# Patient Record
Sex: FEMALE | Race: BLACK OR AFRICAN AMERICAN | NOT HISPANIC OR LATINO | Employment: FULL TIME | URBAN - METROPOLITAN AREA
[De-identification: names, ages, dates, MRNs, and addresses within clinical notes are randomized per-mention and may not be internally consistent; named-entity substitution may affect disease eponyms.]

---

## 2023-08-21 ENCOUNTER — HOSPITAL ENCOUNTER (OUTPATIENT)
Dept: DATA CONVERSION | Facility: HOSPITAL | Age: 18
End: 2023-08-21
Attending: STUDENT IN AN ORGANIZED HEALTH CARE EDUCATION/TRAINING PROGRAM | Admitting: STUDENT IN AN ORGANIZED HEALTH CARE EDUCATION/TRAINING PROGRAM
Payer: MEDICAID

## 2023-08-21 DIAGNOSIS — K50.10 CROHN'S DISEASE OF LARGE INTESTINE WITHOUT COMPLICATIONS (MULTI): ICD-10-CM

## 2023-08-25 LAB
COMPLETE PATHOLOGY REPORT: NORMAL
CONVERTED CLINICAL DIAGNOSIS-HISTORY: NORMAL
CONVERTED FINAL DIAGNOSIS: NORMAL
CONVERTED FINAL REPORT PDF LINK TO COPY AND PASTE: NORMAL
CONVERTED GROSS DESCRIPTION: NORMAL

## 2023-09-29 VITALS
HEART RATE: 99 BPM | TEMPERATURE: 96.8 F | SYSTOLIC BLOOD PRESSURE: 110 MMHG | RESPIRATION RATE: 20 BRPM | DIASTOLIC BLOOD PRESSURE: 66 MMHG

## 2023-09-30 NOTE — H&P
History of Present Illness:   Pregnant/Lactating:  ·  Are You Pregnant no (1)   ·  Are You Currently Breastfeeding no (1)     History Present Illness:  Reason for surgery: IBD   HPI:    Marian is a 18 year old female with IBD here today for Esophagogastroduodenoscopy (EGD) and colonoscopy     Allergies:        Allergies:  ·  NKDA :   ·  Dust : Other  ·  Pollen : Other  ·  Dust : Unknown, HS  ·  Pollen : Unknown, [facility]    Home Medication Review:   Home Medications Reviewed: yes     Impression/Procedure:   ·  Impression and Planned Procedure: IBD       ERAS (Enhanced Recovery After Surgery):  ·  ERAS Patient: no       Vital Signs:  Temperature C: 36 degrees C   Temperature F: 96.8 degrees F   Heart Rate: 99 beats per minute   Respiratory Rate: 20 breath per minute   Blood Pressure Systolic: 110 mm/Hg   Blood Pressure Diastolic: 66 mm/Hg     Physical Exam by System:    Constitutional: Awake, alert, no acute distress   Eyes: No discharge, conjunctivae clear   ENMT: Moist mucous membranes   Respiratory/Thorax: Unlabored breathing, symmetric  chest rise   Cardiovascular: Capillary refill < 2 seconds   Gastrointestinal: Soft, Non tender, non distended,  no palpable masses   Musculoskeletal: no gross deformities   Extremities: no edema   Neurological: awake, alert and interactive   Skin: Warm, well perfused, no generalized rashes     Consent:   COVID-19 Consent:  ·  COVID-19 Risk Consent Surgeon has reviewed key risks related to the risk of lucretia COVID-19 and if they contract COVID-19 what the risks are.     Attestation:   Note Completion:  I am a:  Resident/Fellow   Attending Attestation I saw and evaluated the patient.  I personally obtained the key and critical portions of the history and physical exam or was physically present for key and  critical portions performed by the resident/fellow. I reviewed the resident/fellow?s documentation and discussed the patient with the resident/fellow.  I agree with the  "resident/fellow?s medical decision making as documented in the note.     I personally evaluated the patient on 21-Aug-2023         Electronic Signatures:  Maria Isabel Hussein)  (Signed 21-Aug-2023 15:12)   Authored: Note Completion   Co-Signer: History of Present Illness, Allergies, Home Medication Review, Impression/Procedure, ERAS, Physical Exam, Consent, Note Completion  Vania Genao (Fellow))  (Signed 21-Aug-2023 13:57)   Authored: History of Present Illness, Allergies, Home  Medication Review, Impression/Procedure, ERAS, Physical Exam, Consent, Note Completion      Last Updated: 21-Aug-2023 15:12 by Maria Isabel Hussein (MD)    References:  1.  Data Referenced From \"Patient Profile - Preop - Pediatric v3\" 21-Aug-2023 12:08   "

## 2023-10-04 DIAGNOSIS — K50.118 CROHN'S DISEASE OF COLON WITH OTHER COMPLICATION (MULTI): ICD-10-CM

## 2023-10-04 RX ORDER — USTEKINUMAB 90 MG/ML
90 INJECTION, SOLUTION SUBCUTANEOUS
Qty: 1 ML | Refills: 3 | Status: SHIPPED | OUTPATIENT
Start: 2023-10-04 | End: 2024-02-06

## 2023-10-05 ENCOUNTER — SPECIALTY PHARMACY (OUTPATIENT)
Dept: PHARMACY | Facility: CLINIC | Age: 18
End: 2023-10-05

## 2023-10-06 ENCOUNTER — TELEPHONE (OUTPATIENT)
Dept: PEDIATRIC GASTROENTEROLOGY | Facility: HOSPITAL | Age: 18
End: 2023-10-06
Payer: MEDICAID

## 2023-10-06 NOTE — TELEPHONE ENCOUNTER
"REBECA [address] Dr patient. Heydi faxed note that [employer] already approved until 6/7/2024 for 90 mg/mL syringe. Filed prescriptions/rx dated 10/5/23.    [facility] also faxed response to [employer] PA-\"We do not manage pharmacy benefits for this plan. Please contact Heydi...\"  "

## 2023-10-11 ENCOUNTER — TELEPHONE (OUTPATIENT)
Dept: PEDIATRIC GASTROENTEROLOGY | Facility: HOSPITAL | Age: 18
End: 2023-10-11
Payer: MEDICAID

## 2023-10-11 NOTE — TELEPHONE ENCOUNTER
[facility] states ready to order and will have $0 co pay.   She suggested Marian call [phone]. Ginger tried contacting Marian while I was on the phone and reports just rang no option to leave VM.Called Marian and left detailed message with number to call to order.    ,DirectAddress_Unknown

## 2023-10-11 NOTE — TELEPHONE ENCOUNTER
Spoke with Marian she said she is having trouble ordering stelara it was due 10-7-23. Called [facility]

## 2023-10-11 NOTE — TELEPHONE ENCOUNTER
Spoke with Marian and provided number for her to call and order. Suggested asking if they can get it to her ASAP.

## 2023-10-25 NOTE — PROGRESS NOTES
Marian Leung is a 18-year-old female with a long history of rectal and anal Crohn's. She had MRIs in 2021, and 2022 that showed a potential intersphincteric abscess or fistula though she has not had symptoms of that. She developed increased anal pain after a flare and was not able get a good exam  in the office so she was taken to the OR to make sure there is nothing infected.       06/03/2022 OPERATION/PROCEDURE: Exam under anesthesia and flexible sigmoidoscopy.     She presents to the office due to have pain investigated.  She reports a couple of weeks ago she noted increased mucous drainage and pain and burning inside the anal canal.  She has not had any fevers at all.  She is having more constipation recently and having to use miralax, but stool are soft and skinny.      8/21/2023 COLONOSCOPY TO TI  A diffuse area of moderately erythematous and ulcerewated mucosa was found in the rectum. Four biopsies were obtained with cold forceps for histology in the right colon, as well as four biopsies in the transverse colon, four biopsies in the left colon and four biopsies in the rectum.  The terminal ileum appeared normal. Four biopsies were obtained with cold forceps for histology in the terminal ileum.    8/21/2023 EGD  The examined esophagus was normal. Four biopsies were obtained with cold forceps for histology in the lower third of the esophagus, as well as  two biopsies in the middle third of the esophagus.  Diffuse moderately erythematous mucosa without bleeding was found in the stomach. Two biopsies were obtained with cold forceps for histology in the gastric antrum, as well as two biopsies in the gastric body.  The second portion of the duodenum was normal. Four biopsies were obtained with cold forceps for histology in the second portion of the duodenum, as well as two biopsies in the duodenal bulb.    FINAL DIAGNOSIS  A.  DUODENUM, SECOND PORTION, BIOPSY:  --NORMAL VILLOUS ARCHITECTURE WITH NO SIGNIFICANT  HISTOPATHOLOGIC CHANGE.     B.  DUODENAL BULB, BIOPSY:    --NORMAL VILLOUS ARCHITECTURE WITH NO SIGNIFICANT HISTOPATHOLOGIC CHANGE.    C.  STOMACH, BIOPSY:    --NO SIGNIFICANT HISTOPATHOLOGIC CHANGE.  --NEGATIVE FOR HELICOBACTER PYLORI-LIKE ORGANISMS BY MORPHOLOGY.    D.  ESOPHAGUS, DISTAL, BIOPSY:    --NO SIGNIFICANT HISTOPATHOLOGIC CHANGE.  --NEGATIVE FOR INTRAEPITHELIAL EOSINOPHILS.    E.  ESOPHAGUS, MID, BIOPSY:  --NO SIGNIFICANT HISTOPATHOLOGIC CHANGE.  --NEGATIVE FOR INTRAEPITHELIAL EOSINOPHILS.    F.  TERMINAL ILEUM, BIOPSY:      --NORMAL VILLOUS ARCHITECTURE WITH NO SIGNIFICANT HISTOPATHOLOGIC CHANGE.    G.  COLON, RIGHT, BIOPSY:    --NO SIGNIFICANT HISTOPATHOLOGIC CHANGE.    H.  COLON, TRANSVERSE, BIOPSY:      --NO SIGNIFICANT HISTOPATHOLOGIC CHANGE.    I.  COLON, LEFT, BIOPSY:      --MILD CHRONIC INACTIVE COLITIS.    J.  RECTUM, BIOPSY:      --MILD CHRONIC PROCTITIS WITH SUPERFICIAL EROSION.      After that scope she started using suppositories, they did not help at all.    Bowel habits are 2x/day sometimes hard - needing miralax.    She is using stellara and methotrexate for her Crohn's disease.           Review of Systems  Constitutional: Negative for fever, chills, anorexia, weight loss, malaise     ENMT: Negative for nasal discharge, congestion, ear pain, mouth pain, throat pain      Respiratory: Negative for cough, hemoptysis, wheezing, shortness of breath      Cardiac: Negative for chest pain, dyspnea on exertion, orthopnea, palpitations, syncope     Gastrointestinal: Negative for nausea, vomiting, diarrhea, constipation, abdominal pain (+) CROHNS     Genitourinary: Negative for discharge, dysuria, flank pain, frequency, hematuria     Musculoskeletal: Negative for decreased ROM, pain, swelling, weakness     Neurological: Negative for dizziness, confusion, headache, seizures, syncope (+) HEADACHES     Psychiatric: Negative for mood changes, anxiety, hallucinations, sleep changes, suicidal ideas       Skin: Negative for mass, pain, itching, rash, ulcer (+) PSORIASIS & ECZEMA     Endocrine: Negative for heat intolerance, cold intolerance, excessive sweating, polyuria, excess thirst      Hematologic/Lymph: Negative for anemia, bruising, easy bleeding, night sweats, petechiae, history of DVT/PE or     Allergic/Immunologic: Negative for anaphylaxis, itchy/ teary eyes, itching, sneezing, swelling     PE  Gen - well appearing in NAD   Abd - soft NT   Pt with severe perianal excoriation all around the anus with open skin and moisture related changes.  Gluteal cleft opening healed.   She has a very tight anal canal with some irregularity and mild stricture proximally 1.5 cm.        Impression - Pt with perianal and low rectal Crohn's  - doing ok with stellara, but seems like she is flaring.  Likely she is not having true constipation, but difficulty evacuating due to her anal pain.      Plan   Fecal calprotectin to see how active her rectal disease is currently.    Anal skin protection measures - aquaphor and calmoseptine for the skin excoriation  Call us back if she is not better and may need another EUA to see what is going on  Right now will not try dilt cream until we have a true diagnosis of fissure and I am unable to tell   Continue miralax to keep stools soft

## 2023-11-01 ENCOUNTER — OFFICE VISIT (OUTPATIENT)
Dept: SURGERY | Facility: CLINIC | Age: 18
End: 2023-11-01
Payer: MEDICAID

## 2023-11-01 VITALS
SYSTOLIC BLOOD PRESSURE: 112 MMHG | DIASTOLIC BLOOD PRESSURE: 66 MMHG | BODY MASS INDEX: 33.87 KG/M2 | HEART RATE: 81 BPM | OXYGEN SATURATION: 98 % | WEIGHT: 168 LBS | HEIGHT: 59 IN | TEMPERATURE: 97.2 F

## 2023-11-01 DIAGNOSIS — K50.919 CROHN'S DISEASE WITH COMPLICATION, UNSPECIFIED GASTROINTESTINAL TRACT LOCATION (MULTI): ICD-10-CM

## 2023-11-01 DIAGNOSIS — L30.9 DERMATITIS, UNSPECIFIED: ICD-10-CM

## 2023-11-01 DIAGNOSIS — K50.118 CROHN'S DISEASE OF COLON WITH OTHER COMPLICATION (MULTI): Primary | ICD-10-CM

## 2023-11-01 PROBLEM — M22.2X1 PATELLOFEMORAL SYNDROME OF BOTH KNEES: Status: ACTIVE | Noted: 2023-11-01

## 2023-11-01 PROBLEM — H04.129 DRY EYE SYNDROME: Status: ACTIVE | Noted: 2023-11-01

## 2023-11-01 PROBLEM — E88.89: Status: ACTIVE | Noted: 2023-11-01

## 2023-11-01 PROBLEM — K50.10: Status: ACTIVE | Noted: 2023-11-01

## 2023-11-01 PROBLEM — L30.8 OTHER SPECIFIED DERMATITIS: Status: ACTIVE | Noted: 2019-02-26

## 2023-11-01 PROBLEM — J06.9 VIRAL URI WITH COUGH: Status: ACTIVE | Noted: 2023-11-01

## 2023-11-01 PROBLEM — L73.8 OTHER SPECIFIED FOLLICULAR DISORDERS: Status: ACTIVE | Noted: 2019-02-26

## 2023-11-01 PROBLEM — L28.0 LICHEN SIMPLEX CHRONICUS: Status: ACTIVE | Noted: 2019-02-26

## 2023-11-01 PROBLEM — R07.89 COSTOCHONDRAL PAIN: Status: ACTIVE | Noted: 2023-11-01

## 2023-11-01 PROBLEM — J02.9 SORE THROAT: Status: ACTIVE | Noted: 2023-11-01

## 2023-11-01 PROBLEM — H52.203 ASTIGMATISM OF BOTH EYES: Status: ACTIVE | Noted: 2023-11-01

## 2023-11-01 PROBLEM — G43.909 HEADACHE, MIGRAINE: Status: ACTIVE | Noted: 2023-11-01

## 2023-11-01 PROBLEM — D84.9 IMMUNOSUPPRESSION (MULTI): Status: ACTIVE | Noted: 2023-11-01

## 2023-11-01 PROBLEM — L73.2 HIDRADENITIS SUPPURATIVA OF LEFT AXILLA: Status: ACTIVE | Noted: 2023-11-01

## 2023-11-01 PROBLEM — K86.9: Status: ACTIVE | Noted: 2023-11-01

## 2023-11-01 PROBLEM — L03.012 PARONYCHIA OF FINGER OF LEFT HAND: Status: ACTIVE | Noted: 2023-11-01

## 2023-11-01 PROBLEM — Z79.631 METHOTREXATE, LONG TERM, CURRENT USE: Status: ACTIVE | Noted: 2023-11-01

## 2023-11-01 PROBLEM — N13.30 HYDRONEPHROSIS OF RIGHT KIDNEY: Status: ACTIVE | Noted: 2023-11-01

## 2023-11-01 PROBLEM — J45.909 ASTHMA (HHS-HCC): Status: ACTIVE | Noted: 2023-11-01

## 2023-11-01 PROBLEM — L73.9 FOLLICULAR DISORDER, UNSPECIFIED: Status: ACTIVE | Noted: 2019-02-26

## 2023-11-01 PROBLEM — H52.13 MYOPIA, BILATERAL: Status: ACTIVE | Noted: 2023-11-01

## 2023-11-01 PROBLEM — L40.0 PSORIASIS VULGARIS: Status: ACTIVE | Noted: 2019-02-26

## 2023-11-01 PROBLEM — K50.90 CROHN'S DISEASE (MULTI): Status: ACTIVE | Noted: 2023-11-01

## 2023-11-01 PROBLEM — M21.852 ACQUIRED DYSPLASIA OF LEFT HIP: Status: ACTIVE | Noted: 2023-11-01

## 2023-11-01 PROBLEM — F90.0 ATTENTION DEFICIT HYPERACTIVITY DISORDER, INATTENTIVE TYPE, MODERATE: Status: ACTIVE | Noted: 2023-11-01

## 2023-11-01 PROBLEM — N28.89 DILATION OF RENAL PELVIS: Status: ACTIVE | Noted: 2023-11-01

## 2023-11-01 PROBLEM — M22.2X2 PATELLOFEMORAL SYNDROME OF BOTH KNEES: Status: ACTIVE | Noted: 2023-11-01

## 2023-11-01 PROBLEM — M21.959 HIP DEFORMITY: Status: ACTIVE | Noted: 2023-11-01

## 2023-11-01 PROBLEM — K50.913 PERIANAL FISTULA DUE TO CROHN'S DISEASE (MULTI): Status: ACTIVE | Noted: 2023-11-01

## 2023-11-01 PROBLEM — R07.9 CHEST PAIN: Status: ACTIVE | Noted: 2023-11-01

## 2023-11-01 PROBLEM — R79.89 LOW VITAMIN D LEVEL: Status: ACTIVE | Noted: 2023-11-01

## 2023-11-01 PROBLEM — D50.9 ANEMIA, IRON DEFICIENCY: Status: ACTIVE | Noted: 2023-11-01

## 2023-11-01 PROBLEM — L40.9 PSORIASIS: Status: ACTIVE | Noted: 2019-02-26

## 2023-11-01 PROCEDURE — 99214 OFFICE O/P EST MOD 30 MIN: CPT | Performed by: COLON & RECTAL SURGERY

## 2023-11-01 RX ORDER — DICYCLOMINE HYDROCHLORIDE 10 MG/1
20 CAPSULE ORAL
COMMUNITY
Start: 2022-04-21 | End: 2024-03-07 | Stop reason: WASHOUT

## 2023-11-01 RX ORDER — FERROUS SULFATE 325(65) MG
65 TABLET, DELAYED RELEASE (ENTERIC COATED) ORAL
COMMUNITY

## 2023-11-01 RX ORDER — DOXYCYCLINE 100 MG/1
100 TABLET ORAL 2 TIMES DAILY
COMMUNITY
Start: 2023-09-01 | End: 2024-03-07 | Stop reason: WASHOUT

## 2023-11-01 RX ORDER — HYOSCYAMINE SULFATE 0.125 MG
TABLET ORAL
COMMUNITY
Start: 2023-03-09 | End: 2024-03-07 | Stop reason: WASHOUT

## 2023-11-01 RX ORDER — CHOLECALCIFEROL (VITAMIN D3) 125 MCG
125 CAPSULE ORAL
COMMUNITY
Start: 2023-10-23

## 2023-11-01 RX ORDER — CLINDAMYCIN PHOSPHATE 10 UG/ML
LOTION TOPICAL
COMMUNITY
Start: 2017-06-26 | End: 2024-03-07 | Stop reason: WASHOUT

## 2023-11-01 RX ORDER — AMMONIUM LACTATE 12 G/100G
LOTION TOPICAL
COMMUNITY
Start: 2016-03-01 | End: 2024-03-07 | Stop reason: WASHOUT

## 2023-11-01 RX ORDER — ACETAMINOPHEN 325 MG/1
TABLET ORAL
COMMUNITY
Start: 2023-03-09 | End: 2024-03-07 | Stop reason: WASHOUT

## 2023-11-01 RX ORDER — METRONIDAZOLE 500 MG/1
500 TABLET ORAL
COMMUNITY
Start: 2023-03-09 | End: 2024-03-07 | Stop reason: WASHOUT

## 2023-11-01 RX ORDER — SULFAMETHOXAZOLE AND TRIMETHOPRIM 800; 160 MG/1; MG/1
1 TABLET ORAL
COMMUNITY
Start: 2015-10-01 | End: 2024-03-07 | Stop reason: WASHOUT

## 2023-11-01 RX ORDER — GRISEOFULVIN 250 MG/1
TABLET ORAL
COMMUNITY
Start: 2015-09-21 | End: 2024-03-07 | Stop reason: WASHOUT

## 2023-11-01 RX ORDER — PIMECROLIMUS 10 MG/G
CREAM TOPICAL
COMMUNITY
Start: 2015-10-29 | End: 2024-03-07 | Stop reason: WASHOUT

## 2023-11-01 RX ORDER — ALBUTEROL SULFATE 0.83 MG/ML
2.5 SOLUTION RESPIRATORY (INHALATION)
COMMUNITY
Start: 2023-09-06 | End: 2024-03-07 | Stop reason: WASHOUT

## 2023-11-01 RX ORDER — BETAMETHASONE DIPROPIONATE 0.5 MG/G
OINTMENT TOPICAL
COMMUNITY
Start: 2018-07-24 | End: 2024-03-07 | Stop reason: WASHOUT

## 2023-11-01 RX ORDER — MESALAMINE 1000 MG/1
1000 SUPPOSITORY RECTAL
COMMUNITY
Start: 2023-08-24

## 2023-11-01 RX ORDER — CIPROFLOXACIN 500 MG/1
500 TABLET ORAL
COMMUNITY
Start: 2023-03-09 | End: 2024-03-07 | Stop reason: WASHOUT

## 2023-11-01 RX ORDER — MESALAMINE 0.38 G/1
CAPSULE, EXTENDED RELEASE ORAL
COMMUNITY

## 2023-11-01 RX ORDER — DESONIDE 0.5 MG/G
OINTMENT TOPICAL 2 TIMES DAILY
COMMUNITY
Start: 2014-12-31 | End: 2024-03-07 | Stop reason: WASHOUT

## 2023-11-01 RX ORDER — ALBUTEROL SULFATE 90 UG/1
2 AEROSOL, METERED RESPIRATORY (INHALATION)
COMMUNITY
Start: 2023-09-06 | End: 2024-03-07 | Stop reason: WASHOUT

## 2023-11-01 RX ORDER — ONDANSETRON HYDROCHLORIDE 8 MG/1
8 TABLET, FILM COATED ORAL
COMMUNITY
Start: 2023-03-09 | End: 2024-03-07 | Stop reason: WASHOUT

## 2023-11-01 RX ORDER — FOLIC ACID 1 MG/1
1 TABLET ORAL DAILY
COMMUNITY

## 2023-11-01 RX ORDER — BROMPHENIRAMINE MALEATE, PSEUDOEPHEDRINE HYDROCHLORIDE, AND DEXTROMETHORPHAN HYDROBROMIDE 2; 30; 10 MG/5ML; MG/5ML; MG/5ML
5 SYRUP ORAL
COMMUNITY
Start: 2023-09-01 | End: 2024-03-07 | Stop reason: WASHOUT

## 2023-11-01 RX ORDER — HYDROCORTISONE 25 MG/G
CREAM TOPICAL 2 TIMES DAILY
COMMUNITY
Start: 2023-07-03

## 2023-11-01 RX ORDER — AZELASTINE HYDROCHLORIDE 0.5 MG/ML
SOLUTION/ DROPS OPHTHALMIC
COMMUNITY
End: 2024-03-07 | Stop reason: WASHOUT

## 2023-11-01 RX ORDER — HYDROCORTISONE 25 MG/G
CREAM TOPICAL
COMMUNITY
Start: 2023-01-24

## 2023-11-01 RX ORDER — CLOBETASOL PROPIONATE 0.5 MG/G
OINTMENT TOPICAL
COMMUNITY
Start: 2023-09-01 | End: 2024-03-07 | Stop reason: WASHOUT

## 2023-11-01 RX ORDER — ALCLOMETASONE DIPROPIONATE 0.5 MG/G
OINTMENT TOPICAL
COMMUNITY
Start: 2015-07-16 | End: 2024-03-07 | Stop reason: WASHOUT

## 2023-11-01 RX ORDER — METHOTREXATE 2.5 MG/1
2.5 TABLET ORAL
COMMUNITY

## 2023-11-01 RX ORDER — OMEPRAZOLE 40 MG/1
40 CAPSULE, DELAYED RELEASE ORAL
COMMUNITY
Start: 2023-03-09

## 2023-11-01 RX ORDER — FLUCONAZOLE 150 MG/1
150 TABLET ORAL
COMMUNITY
Start: 2015-10-22 | End: 2024-03-07 | Stop reason: WASHOUT

## 2023-11-01 RX ORDER — ACETAMINOPHEN 500 MG
5000 TABLET ORAL
COMMUNITY
Start: 2023-10-23

## 2023-11-01 RX ORDER — FLUTICASONE PROPIONATE 50 MCG
1 SPRAY, SUSPENSION (ML) NASAL 2 TIMES DAILY
COMMUNITY
Start: 2023-09-01

## 2023-11-01 RX ORDER — TRIAMCINOLONE ACETONIDE OINTMENT USP, 0.05% 0.5 MG/G
OINTMENT TOPICAL
COMMUNITY
Start: 2015-10-22 | End: 2024-03-07 | Stop reason: WASHOUT

## 2023-11-01 RX ORDER — TRIAMCINOLONE ACETONIDE 1 MG/G
CREAM TOPICAL
COMMUNITY
Start: 2015-07-27

## 2023-11-01 RX ORDER — CRISABOROLE 20 MG/G
OINTMENT TOPICAL
COMMUNITY
Start: 2022-06-08 | End: 2024-03-07 | Stop reason: WASHOUT

## 2023-11-01 RX ORDER — BENZONATATE 200 MG/1
200 CAPSULE ORAL
COMMUNITY
Start: 2023-04-25 | End: 2024-03-07 | Stop reason: WASHOUT

## 2023-11-01 RX ORDER — TACROLIMUS 0.3 MG/G
OINTMENT TOPICAL
COMMUNITY
Start: 2015-09-02 | End: 2024-03-07 | Stop reason: WASHOUT

## 2023-11-01 RX ORDER — CLOTRIMAZOLE 1 %
CREAM (GRAM) TOPICAL
COMMUNITY
Start: 2017-06-26 | End: 2024-03-07 | Stop reason: WASHOUT

## 2023-11-01 RX ORDER — BENZOCAINE .13; .15; .5; 2 G/100G; G/100G; G/100G; G/100G
2 GEL ORAL DAILY
COMMUNITY
Start: 2022-10-15 | End: 2024-03-07 | Stop reason: WASHOUT

## 2023-11-01 RX ORDER — KETOCONAZOLE 20 MG/ML
SHAMPOO, SUSPENSION TOPICAL
COMMUNITY
Start: 2015-06-03 | End: 2024-03-07 | Stop reason: WASHOUT

## 2023-11-01 RX ORDER — PREDNISONE 20 MG/1
20 TABLET ORAL
COMMUNITY
Start: 2023-09-06

## 2023-11-01 RX ORDER — ACETAMINOPHEN 500 MG
2000 TABLET ORAL DAILY
COMMUNITY
End: 2024-03-07 | Stop reason: WASHOUT

## 2023-11-01 ASSESSMENT — ENCOUNTER SYMPTOMS: DEPRESSION: 0

## 2023-11-01 ASSESSMENT — PAIN SCALES - GENERAL: PAINLEVEL: 2

## 2023-12-04 NOTE — PROGRESS NOTES
Ayden Leung is a 18-year-old female with a long history of rectal and anal Crohn's. She had MRIs in 2021, and 2022 that showed a potential intersphincteric abscess or fistula though she has not had symptoms of that. She developed increased anal pain after a flare and was not able get a good exam  in the office so she was taken to the OR to make sure there is nothing infected.      06/03/2022 OPERATION/PROCEDURE: Exam under anesthesia and flexible sigmoidoscopy.      8/21/2023 COLONOSCOPY TO TI  A diffuse area of moderately erythematous and ulcerewated mucosa was found in the rectum. Four biopsies were obtained with cold forceps for histology in the right colon, as well as four biopsies in the transverse colon, four biopsies in the left colon and four biopsies in the rectum.  The terminal ileum appeared normal. Four biopsies were obtained with cold forceps for histology in the terminal ileum.     8/21/2023 EGD  The examined esophagus was normal. Four biopsies were obtained with cold forceps for histology in the lower third of the esophagus, as well as  two biopsies in the middle third of the esophagus.  Diffuse moderately erythematous mucosa without bleeding was found in the stomach. Two biopsies were obtained with cold forceps for histology in the gastric antrum, as well as two biopsies in the gastric body.  The second portion of the duodenum was normal. Four biopsies were obtained with cold forceps for histology in the second portion of the duodenum, as well as two biopsies in the duodenal bulb.     FINAL DIAGNOSIS  A.  DUODENUM, SECOND PORTION, BIOPSY:  --NORMAL VILLOUS ARCHITECTURE WITH NO SIGNIFICANT HISTOPATHOLOGIC CHANGE.     B.  DUODENAL BULB, BIOPSY:    --NORMAL VILLOUS ARCHITECTURE WITH NO SIGNIFICANT HISTOPATHOLOGIC CHANGE.    C.  STOMACH, BIOPSY:    --NO SIGNIFICANT HISTOPATHOLOGIC CHANGE.  --NEGATIVE FOR HELICOBACTER PYLORI-LIKE ORGANISMS BY MORPHOLOGY.    D.  ESOPHAGUS, DISTAL, BIOPSY:    --NO  SIGNIFICANT HISTOPATHOLOGIC CHANGE.  --NEGATIVE FOR INTRAEPITHELIAL EOSINOPHILS.    E.  ESOPHAGUS, MID, BIOPSY:  --NO SIGNIFICANT HISTOPATHOLOGIC CHANGE.  --NEGATIVE FOR INTRAEPITHELIAL EOSINOPHILS.    F.  TERMINAL ILEUM, BIOPSY:      --NORMAL VILLOUS ARCHITECTURE WITH NO SIGNIFICANT HISTOPATHOLOGIC CHANGE.    G.  COLON, RIGHT, BIOPSY:    --NO SIGNIFICANT HISTOPATHOLOGIC CHANGE.    H.  COLON, TRANSVERSE, BIOPSY:      --NO SIGNIFICANT HISTOPATHOLOGIC CHANGE.    I.  COLON, LEFT, BIOPSY:      --MILD CHRONIC INACTIVE COLITIS.    J.  RECTUM, BIOPSY:      --MILD CHRONIC PROCTITIS WITH SUPERFICIAL EROSION.     She presented to office November 1, 2023, to have pain investigated.  She reported a couple of weeks prior to the appointment she noted increased mucous drainage and pain and burning inside the anal canal.   She was  having more constipation and having to use miralax, but stool are soft and skinny.    A fecal calprotectin was ordered by not completed.    Went to urgent care on December 4, 2023,  for Bartholin's gland abscess.  Was draining pus and blood. Was put on Bactrim.  Took Tylenol for pain. No narcotics.  Currently on Stellara.  Has been having trouble moving her bowels.  She has not moved her bowels for two weeks.  She is taking Miralax but it's not helping.  She is having LLQ abdominal pain that is crampy and intermittent - she is pretty sure that it is related to her constipation.    Some weeks she can move her bowels and other weeks it's more difficult.  She has not moved her bowels in a couple of weeks.  She is nervous about it now.  That being said her bottom is feeling better           Review of Systems  Constitutional: Negative for fever, chills, anorexia, weight loss, malaise      ENMT: Negative for nasal discharge, congestion, ear pain, mouth pain, throat pain      Respiratory: Negative for cough, hemoptysis, wheezing, shortness of breath      Cardiac: Negative for chest pain, dyspnea on exertion,  orthopnea, palpitations, syncope      Gastrointestinal: Negative for nausea, vomiting, diarrhea, constipation, abdominal pain (+) CROHNS      Genitourinary: Negative for discharge, dysuria, flank pain, frequency, hematuria      Musculoskeletal: Negative for decreased ROM, pain, swelling, weakness      Neurological: Negative for dizziness, confusion, headache, seizures, syncope (+) HEADACHES      Psychiatric: Negative for mood changes, anxiety, hallucinations, sleep changes, suicidal ideas      Skin: Negative for mass, pain, itching, rash, ulcer (+) PSORIASIS & ECZEMA      Endocrine: Negative for heat intolerance, cold intolerance, excessive sweating, polyuria, excess thirst      Hematologic/Lymph: Negative for anemia, bruising, easy bleeding, night sweats, petechiae, history of DVT/PE or      Allergic/Immunologic: Negative for anaphylaxis, itchy/ teary eyes, itching, sneezing, swelling      Physical Exam:   gen - well appearing in NAD  Abd - minimally tender, mild bloating, nonperitonitic  Perineum - no visible area of incision - no swollen areas in the labia  There is a midline skin ulcer between the anus and the vagina - not draining.  Same in the midline cleft.  The perianal skin looks MUCH better - there is some white caking over it, otherwise the skin is basically healed.  There is a stricture at the top of the anal canal that is 1 cm and firm currently - very painful for the pt.      Impression - Pt with Anal Crohn's and severe constipation - likely related to her stricture     Plan   Lets try miralax prep to get things moving through - instructions given  Instructed to use miralax and metamucil once empty and then if no BM for 3-4 days stimulant laxative   Nystatin powder for yeast of perianal skin  If she can not poop will need an EUA.

## 2023-12-13 ENCOUNTER — OFFICE VISIT (OUTPATIENT)
Dept: SURGERY | Facility: CLINIC | Age: 18
End: 2023-12-13
Payer: MEDICAID

## 2023-12-13 VITALS
RESPIRATION RATE: 16 BRPM | HEIGHT: 59 IN | SYSTOLIC BLOOD PRESSURE: 99 MMHG | DIASTOLIC BLOOD PRESSURE: 60 MMHG | TEMPERATURE: 97.5 F | BODY MASS INDEX: 34.27 KG/M2 | WEIGHT: 170 LBS | HEART RATE: 77 BPM

## 2023-12-13 DIAGNOSIS — K50.913 PERIANAL FISTULA DUE TO CROHN'S DISEASE (MULTI): Primary | ICD-10-CM

## 2023-12-13 PROCEDURE — 1036F TOBACCO NON-USER: CPT | Performed by: COLON & RECTAL SURGERY

## 2023-12-13 PROCEDURE — 99213 OFFICE O/P EST LOW 20 MIN: CPT | Performed by: COLON & RECTAL SURGERY

## 2023-12-13 RX ORDER — NYSTATIN 100000 [USP'U]/G
1 POWDER TOPICAL 2 TIMES DAILY
Qty: 30 G | Refills: 11 | Status: SHIPPED | OUTPATIENT
Start: 2023-12-13 | End: 2024-03-07 | Stop reason: WASHOUT

## 2023-12-13 ASSESSMENT — PAIN SCALES - GENERAL: PAINLEVEL: 4

## 2023-12-18 ENCOUNTER — TELEPHONE (OUTPATIENT)
Dept: PEDIATRIC GASTROENTEROLOGY | Facility: HOSPITAL | Age: 18
End: 2023-12-18
Payer: MEDICAID

## 2023-12-21 ENCOUNTER — APPOINTMENT (OUTPATIENT)
Dept: OBSTETRICS AND GYNECOLOGY | Facility: CLINIC | Age: 18
End: 2023-12-21
Payer: MEDICAID

## 2024-01-11 ENCOUNTER — TELEPHONE (OUTPATIENT)
Dept: PEDIATRIC GASTROENTEROLOGY | Facility: HOSPITAL | Age: 19
End: 2024-01-11
Payer: MEDICAID

## 2024-01-18 ENCOUNTER — TELEPHONE (OUTPATIENT)
Dept: PEDIATRIC GASTROENTEROLOGY | Facility: CLINIC | Age: 19
End: 2024-01-18
Payer: MEDICAID

## 2024-01-18 NOTE — TELEPHONE ENCOUNTER
----- Message from Yoselin Helton RN sent at 1/18/2024  3:54 PM EST -----  Regarding: FW:  Hi Leslie,    I'm on main campus today and just tried to call you, but no need to return my call!   I just wanted to fill you in on this patient!    I spoke to Dr. Hussein about this patient today. She gave me the green light to send this patient the official discharge from [facility] GI letter. We will be sending her a certified discharge letter, which includes an adult GI provider referral.     In addition, I did leave her a voicemail making her aware we will be transitioning her to adult GI and informing her Dr. Hussein will be leaving the  organization. I told her we are happy to set her up for success and make sure she has medication renewals for an appropriate timeline until she can get an appointment with adult GI (3-6 months of medication renewal).    I did refer her to Jessica Devi NP for an adult provider in her certified letter, based off her provided address in Epic.    I am hoping to get a call back from her to ensure she is good to go! Just wanted to fill you in so you can renew orders and care accordingly moving forward.    Please let me know if you have any questions!    Thanks,  Yoselin      ----- Message -----  From: Maria Isabel Hussein MD  Sent: 1/17/2024   4:55 PM EST  To: Yoselin Helton RN  Subject: RE:                                              Send her the letter.  I can't reach out.  Thanks      ----- Message -----  From: Yoselin Helton RN  Sent: 1/17/2024  10:09 AM EST  To: Maria Isabel Hussein MD  Subject: RE:                                              Lester Hussein,  I know you said you were going to attempt to contact her last week. Any luck? I tried as well and was going to attempt to call today or tomorrow. Just wanted to touch base with you first!  Let me know!    Yoselin   ----- Message -----  From: Maria Isabel Hussein MD  Sent: 1/11/2024  12:48 PM EST  To: Yoselin Helton RN  Subject:  RE:                                              She needs to be transition!!!!!  ----- Message -----  From: Yoselin Helton RN  Sent: 1/11/2024  12:47 PM EST  To: Maria Isabel Hussein MD; Jazmyn Rosa RN    Hi,  Just wanted to touch base about this patient. I came across her as I was reviewing your IBD patients    She was seen in [facility] 9/7/2022 for a formal transition visit with Dr. Alvarez.     It appears she never made a FUV with adult (at that time I was not tracking to ensure a FUV with adult was made, I now am.)    Since then she has been seen 3 times by Peds GI...She does not have a FUV scheduled currently.    Just wanted to touch base on a plan for this patient-is she appropriate for adult?    Please let me know & I can facilitate the process.    Thanks,  Yoselin

## 2024-02-06 DIAGNOSIS — K50.118 CROHN'S DISEASE OF COLON WITH OTHER COMPLICATION (MULTI): ICD-10-CM

## 2024-02-06 RX ORDER — USTEKINUMAB 90 MG/ML
INJECTION, SOLUTION SUBCUTANEOUS
Qty: 1 ML | Refills: 3 | Status: SHIPPED | OUTPATIENT
Start: 2024-02-06

## 2024-02-27 ENCOUNTER — APPOINTMENT (OUTPATIENT)
Dept: GASTROENTEROLOGY | Facility: HOSPITAL | Age: 19
End: 2024-02-27
Payer: MEDICAID

## 2024-03-01 NOTE — PROGRESS NOTES
Marian Leung is a 18 y.o. female with past medical history of psoriasis and psoriatic arthritis who presents today for Crohn's disease. She is transitioning care from pediatric GI. She has a long history of ileocolonic Crohn's disease complicated by perianal disease. Initially diagnosed age 10 (initially thought to be UC). Initially treated with Humira with partial response to her Crohn's and no response for her psoriasis. Transitioned to Remicade which she was on for about 2 years but ultimately discontinued due to inadequate response despite dose escalation. Started Stelara around 2019.    In 2022 she had EGD/colonoscopy showing active gastritis. No. H. Pylori or evidence of celiac disease. Mildly inflamed TI with chronic inactive enteritis with granulomas. Inactive chronic colitis in cecum. Severe chronic active colitis in transverse colon. Left colon with minimal inflammation. Perianal fistula. Saw Dr. Polk for EUA and given antibiotics.     Stelara increased to every 4 week dosing and oral Methotrexate added, prescribed by her rheumatologist who she follows with for psoriatic arthritis/joint pain.    Was doing well, then in 2023 had increased constipation with rectal burning and mucus. FCP ordered but not completed. EGD/colonoscopy 8/2023 showed gastritis (no H. Pylori). Colonoscopy with diffuse ulcerated mucosa in rectum. Path showing mild chronic proctitis with erosion. Mild chronic inactive colitis in left colon. Normal TI and right colon. Overall improved from year prior. Given steroids.    12/2023 Went to urgent care for Bartholin's gland abscess. Was draining pus and blood. Treated with Bactrim with improvement.    Today states overall feels well. Continues on Stelara injections every 4 weeks with oral Methotrexate once a week (4 tabs in AM and 4 tabs in PM). She has 2-3 bowel movements per day of varying consistency. No urgency or rectal bleeding. Fistula healed without tenderness or  drainage. Occasional left sided abdominal pain and bloating after certain foods. No nausea, vomiting, difficulty eating, heartburn, reflux or dysphagia. No unintentional weight loss. Denies oral aphthous ulcerations. Endorses left sided hip pain. Psoriasis overall well controlled. No prior abdominal surgery.     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 No tobacco. Denies alcohol and illicit drugs.    Family history: Denies family history of IBD, colon cancer, or other GI disorders or malignancy.     Past Medical History:   Diagnosis Date    Abnormal levels of other serum enzymes 06/01/2020    Elevated serum GGT level    Acute candidiasis of vulva and vagina     Yeast infection of the vagina    Acute vulvitis 01/30/2017    Vulvar inflammation    Cellulitis of left finger 03/01/2016    Paronychia of third finger, left    Chest pain, unspecified 12/05/2017    Chest pain in patient younger than 17 years    Encounter for contraceptive management, unspecified 08/27/2018    Contraception management    Encounter for immunization     Immunization due    Encounter for routine child health examination without abnormal findings 05/21/2018    Encounter for routine child health examination without abnormal findings    Long term (current) use of immunosuppressive biologic 11/21/2017    Adalimumab (Humira) long-term use    Long term (current) use of immunosuppressive biologic 07/07/2020    Infliximab (Remicade) long-term use    Long term (current) use of inhibitors of nucleotide synthesis 05/02/2018    Encounter for long term current azathioprine therapy    Low back pain, unspecified 06/01/2020    Acute midline low back pain without sciatica    Mild intermittent asthma with (acute) exacerbation 04/06/2018    Mild intermittent asthma with acute exacerbation    Myopia, bilateral 03/10/2017    Myopia of both eyes with astigmatism     Other chest pain 05/28/2019    Chest wall pain    Pain in unspecified joint 12/15/2016    Arthralgia of multiple joints    Personal history of diseases of the skin and subcutaneous tissue 06/01/2020    History of cellulitis    Personal history of other diseases of the digestive system 07/07/2020    History of bloody stools    Personal history of other diseases of the digestive system 08/22/2017    History of rectal bleeding    Personal history of other diseases of the digestive system 11/21/2017    History of gastroesophageal reflux (GERD)    Personal history of other diseases of the musculoskeletal system and connective tissue 12/15/2016    History of arthritis    Personal history of other endocrine, nutritional and metabolic disease 06/16/2017    History of vitamin D deficiency    Personal history of other infectious and parasitic diseases 07/07/2020    History of Clostridioides difficile colitis    Personal history of other specified conditions 04/06/2018    History of chest pain    Personal history of other specified conditions 11/21/2017    History of abdominal pain    Pruritus vulvae 12/13/2016    Vulvar itching    Suppurative otitis media, unspecified, unspecified ear 11/23/2015    Otitis media, purulent     Past Surgical History:   Procedure Laterality Date    OTHER SURGICAL HISTORY  05/25/2022    Colonoscopy     Current Outpatient Medications   Medication Sig Dispense Refill    cholecalciferol (Vitamin D-3) 125 MCG (5000 UT) capsule Take 1 capsule (125 mcg) by mouth.      cholecalciferol (Vitamin D-3) 5,000 Units tablet Take 1 tablet (5,000 Units) by mouth once daily.      ferrous sulfate 325 (65 Fe) MG EC tablet Take 65 mg by mouth.      fluticasone (Flonase) 50 mcg/actuation nasal spray Administer 1 spray into each nostril 2 times a day.      folic acid (Folvite) 1 mg tablet Take 1 tablet (1 mg) by mouth once daily.      hydrocortisone (Anusol-HC) 2.5 % rectal cream APPLY TOPICALLY TO PERIANAL AREA  "TWICE DAILY      hydrocortisone 2.5 % cream Apply topically 2 times a day.      mesalamine (Canasa) 1,000 mg suppository Insert 1 suppository (1,000 mg) into the rectum.      mesalamine ER (Apriso) 0.375 gram 24 hr capsule TAKE 4 CAPSULES BY MOUTH DAILY IN THE MORNING      methotrexate (Trexall) 2.5 mg tablet Take 1 tablet (2.5 mg total) by mouth.      omeprazole (PriLOSEC) 40 mg DR capsule Take 1 capsule (40 mg) by mouth.      predniSONE (Deltasone) 20 mg tablet Take 1 tablet (20 mg) by mouth.      Stelara injection INJECT 1 SYRINGE SUBCUTANEOUSLY  EVERY 4 WEEKS 1 mL 3    triamcinolone (Kenalog) 0.1 % cream 1 Application       No current facility-administered medications for this visit.     Allergies   Allergen Reactions    Bee Pollen Unknown    House Dust Other     Family History   Problem Relation Name Age of Onset    Migraines Mother      Asthma Brother      Diabetes Maternal Grandmother      Diabetes Other Maternal aunt     Diabetes Other      Hypertension Other      Ovarian cancer Other         Review of Systems  Review of Systems negative except as noted in HPI.    Objective     /82   Pulse 86   Temp 36.7 °C (98.1 °F)   Ht 1.499 m (4' 11\")   Wt 71.7 kg (158 lb)   BMI 31.91 kg/m²      Physical Exam  Constitutional:  No acute distress. Normal appearance. Not ill-appearing.  HENT:  Head normocephalic and atraumatic. Conjunctivae normal.  Cardiovascular:  Normal rate. Regular rhythm.  Pulmonary:  Pulmonary effort normal. No respiratory distress. Breath sounds clear.  Abdominal:  Abdomen is flat and soft. There is no distension. Left sided tenderness with deep palpation. No rebound or guarding.  Skin: Dry.  Neurological:  Alert and oriented.  Psychiatric:  Mood and affect normal.    Assessment/Plan     18 y.o. female with history of psoriasis and psoriatic arthritis who presents today for initial clinic visit for Crohn's disease, initially diagnosed age 10. She has had numerous drug failures including " Humira and Remicade. She has been on Stelara since 2019, now dose escalated to every 4 weeks with oral Methotrexate as prescribed by her Rheumatologist for joint pains. She overall feels well and her psoriasis and Crohn's are clinically improved. However she has never had a colonoscopy showing endoscopic remission. She is interested in coming off Methotrexate. We discussed obtaining labs, fecal calprotectin, and colonoscopy to assess disease activity. If she is in remission we can consider weaning the Methotrexate. However if disease is not adequately controlled and her symptoms return consider transitioning to Skyrizi. This was discussed today.    Recommendations  Continue Stelara injections every 4 weeks.  Obtain labs and fecal calprotectin.  Schedule colonoscopy for Summer 2024. Discussed procedure and Miralax prep instructions.  Follow up after procedure, or sooner if needed.    Health maintenance  - T spot: Negative 2022  - Shingrix:  - Pneumonia:  - Influenza: Received [id number]  - Covid: Received with last booster 2023  - Hepatitis B:  - Dermatology:  - Pap smear: Not UTD, recommended  - DEXA:    Electronically signed by: Josee Castillo CNP on 3/7/2024 at 3:21 PM

## 2024-03-07 ENCOUNTER — LAB (OUTPATIENT)
Dept: LAB | Facility: LAB | Age: 19
End: 2024-03-07

## 2024-03-07 ENCOUNTER — OFFICE VISIT (OUTPATIENT)
Dept: GASTROENTEROLOGY | Facility: CLINIC | Age: 19
End: 2024-03-07

## 2024-03-07 VITALS
WEIGHT: 158 LBS | BODY MASS INDEX: 31.85 KG/M2 | DIASTOLIC BLOOD PRESSURE: 82 MMHG | SYSTOLIC BLOOD PRESSURE: 115 MMHG | HEIGHT: 59 IN | HEART RATE: 86 BPM | TEMPERATURE: 98.1 F

## 2024-03-07 DIAGNOSIS — K50.818 CROHN'S DISEASE OF BOTH SMALL AND LARGE INTESTINE WITH OTHER COMPLICATION (MULTI): Primary | ICD-10-CM

## 2024-03-07 DIAGNOSIS — Z11.1 SCREENING FOR TUBERCULOSIS: ICD-10-CM

## 2024-03-07 DIAGNOSIS — K50.818 CROHN'S DISEASE OF BOTH SMALL AND LARGE INTESTINE WITH OTHER COMPLICATION (MULTI): ICD-10-CM

## 2024-03-07 LAB
ALBUMIN SERPL BCP-MCNC: 3.9 G/DL (ref 3.4–5)
ALP SERPL-CCNC: 66 U/L (ref 33–110)
ALT SERPL W P-5'-P-CCNC: 10 U/L (ref 7–45)
ANION GAP SERPL CALC-SCNC: 15 MMOL/L (ref 10–20)
AST SERPL W P-5'-P-CCNC: 13 U/L (ref 9–39)
BILIRUB SERPL-MCNC: 0.5 MG/DL (ref 0–1.2)
BUN SERPL-MCNC: 10 MG/DL (ref 6–23)
CALCIUM SERPL-MCNC: 9.6 MG/DL (ref 8.6–10.6)
CHLORIDE SERPL-SCNC: 104 MMOL/L (ref 98–107)
CO2 SERPL-SCNC: 26 MMOL/L (ref 21–32)
CREAT SERPL-MCNC: 0.72 MG/DL (ref 0.5–1.05)
CRP SERPL-MCNC: 1.01 MG/DL
EGFRCR SERPLBLD CKD-EPI 2021: >90 ML/MIN/1.73M*2
ERYTHROCYTE [DISTWIDTH] IN BLOOD BY AUTOMATED COUNT: 14.5 % (ref 11.5–14.5)
GLUCOSE SERPL-MCNC: 79 MG/DL (ref 74–99)
HCT VFR BLD AUTO: 39.3 % (ref 36–46)
HGB BLD-MCNC: 12.7 G/DL (ref 12–16)
MCH RBC QN AUTO: 24.9 PG (ref 26–34)
MCHC RBC AUTO-ENTMCNC: 32.3 G/DL (ref 32–36)
MCV RBC AUTO: 77 FL (ref 80–100)
NRBC BLD-RTO: 0 /100 WBCS (ref 0–0)
PLATELET # BLD AUTO: 376 X10*3/UL (ref 150–450)
POTASSIUM SERPL-SCNC: 4 MMOL/L (ref 3.5–5.3)
PROT SERPL-MCNC: 7.5 G/DL (ref 6.4–8.2)
RBC # BLD AUTO: 5.1 X10*6/UL (ref 4–5.2)
SODIUM SERPL-SCNC: 141 MMOL/L (ref 136–145)
WBC # BLD AUTO: 8.1 X10*3/UL (ref 4.4–11.3)

## 2024-03-07 PROCEDURE — 80053 COMPREHEN METABOLIC PANEL: CPT

## 2024-03-07 PROCEDURE — 1036F TOBACCO NON-USER: CPT | Performed by: NURSE PRACTITIONER

## 2024-03-07 PROCEDURE — 86481 TB AG RESPONSE T-CELL SUSP: CPT

## 2024-03-07 PROCEDURE — 99214 OFFICE O/P EST MOD 30 MIN: CPT | Performed by: NURSE PRACTITIONER

## 2024-03-07 PROCEDURE — 36415 COLL VENOUS BLD VENIPUNCTURE: CPT

## 2024-03-07 PROCEDURE — 86140 C-REACTIVE PROTEIN: CPT

## 2024-03-07 PROCEDURE — 85027 COMPLETE CBC AUTOMATED: CPT

## 2024-03-07 PROCEDURE — 99204 OFFICE O/P NEW MOD 45 MIN: CPT | Performed by: NURSE PRACTITIONER

## 2024-03-07 NOTE — PATIENT INSTRUCTIONS
Recommendations  Obtain labs and fecal calprotectin stool test.  Schedule colonoscopy for Summer 2024. You can call [phone] to schedule. Make sure to ask for Miralax prep instructions.  Follow up a few weeks after your procedure. You can call my nurse, Deanne at [phone] with any questions or concerns.

## 2024-03-10 LAB
NIL(NEG) CONTROL SPOT COUNT: NORMAL
PANEL A SPOT COUNT: 0
PANEL B SPOT COUNT: 0
POS CONTROL SPOT COUNT: NORMAL
T-SPOT. TB INTERPRETATION: NEGATIVE

## 2024-08-21 ENCOUNTER — TELEPHONE (OUTPATIENT)
Dept: GASTROENTEROLOGY | Facility: HOSPITAL | Age: 19
End: 2024-08-21

## 2024-08-22 ENCOUNTER — TELEPHONE (OUTPATIENT)
Dept: GASTROENTEROLOGY | Facility: HOSPITAL | Age: 19
End: 2024-08-22